# Patient Record
Sex: FEMALE
[De-identification: names, ages, dates, MRNs, and addresses within clinical notes are randomized per-mention and may not be internally consistent; named-entity substitution may affect disease eponyms.]

---

## 2017-11-12 ENCOUNTER — HOSPITAL (OUTPATIENT)
Dept: OTHER | Age: 57
End: 2017-11-12
Attending: EMERGENCY MEDICINE

## 2017-11-12 LAB
ANALYZER ANC (IANC): ABNORMAL
ANION GAP SERPL CALC-SCNC: 12 MMOL/L (ref 10–20)
APTT PPP: 27 SECONDS (ref 22–30)
APTT PPP: NORMAL S
BASOPHILS # BLD: 0.1 THOUSAND/MCL (ref 0–0.3)
BASOPHILS NFR BLD: 1 %
BUN SERPL-MCNC: 12 MG/DL (ref 6–20)
BUN/CREAT SERPL: 16 (ref 7–25)
CALCIUM SERPL-MCNC: 9.4 MG/DL (ref 8.4–10.2)
CHLORIDE: 107 MMOL/L (ref 98–107)
CO2 SERPL-SCNC: 24 MMOL/L (ref 21–32)
CREAT SERPL-MCNC: 0.75 MG/DL (ref 0.51–0.95)
DIFFERENTIAL METHOD BLD: ABNORMAL
EOSINOPHIL # BLD: 0.3 THOUSAND/MCL (ref 0.1–0.5)
EOSINOPHIL NFR BLD: 3 %
ERYTHROCYTE [DISTWIDTH] IN BLOOD: 15.1 % (ref 11–15)
GLUCOSE SERPL-MCNC: 136 MG/DL (ref 65–99)
HEMATOCRIT: 48.5 % (ref 36–46.5)
HGB BLD-MCNC: 17.3 GM/DL (ref 12–15.5)
INR PPP: 1.1
LYMPHOCYTES # BLD: 2.4 THOUSAND/MCL (ref 1–4)
LYMPHOCYTES NFR BLD: 22 %
MCH RBC QN AUTO: 35.2 PG (ref 26–34)
MCHC RBC AUTO-ENTMCNC: 35.7 GM/DL (ref 32–36.5)
MCV RBC AUTO: 98.6 FL (ref 78–100)
MONOCYTES # BLD: 0.9 THOUSAND/MCL (ref 0.3–0.9)
MONOCYTES NFR BLD: 9 %
NEUTROPHILS # BLD: 7.1 THOUSAND/MCL (ref 1.8–7.7)
NEUTROPHILS NFR BLD: 65 %
NEUTS SEG NFR BLD: ABNORMAL %
PERCENT NRBC: 0
PLATELET # BLD: 452 THOUSAND/MCL (ref 140–450)
POTASSIUM SERPL-SCNC: 3.8 MMOL/L (ref 3.4–5.1)
PROTHROMBIN TIME: 11.1 SECONDS (ref 9.7–11.8)
PROTHROMBIN TIME: NORMAL
RBC # BLD: 4.92 MILLION/MCL (ref 4–5.2)
SODIUM SERPL-SCNC: 139 MMOL/L (ref 135–145)
WBC # BLD: 10.8 THOUSAND/MCL (ref 4.2–11)

## 2017-11-20 PROBLEM — I82.411 DVT OF DEEP FEMORAL VEIN, RIGHT (HCC): Status: ACTIVE | Noted: 2017-11-20

## 2018-10-03 PROBLEM — I97.418 ARTERIAL BLEED, INTRAOPERATIVE: Status: ACTIVE | Noted: 2018-10-03

## 2018-10-03 PROBLEM — I25.118 CORONARY ARTERY DISEASE OF NATIVE ARTERY OF NATIVE HEART WITH STABLE ANGINA PECTORIS (HCC): Status: ACTIVE | Noted: 2018-10-03

## 2018-10-03 PROBLEM — IMO0002 INTRA-ABDOMINAL HEMATOMA: Status: ACTIVE | Noted: 2018-10-03

## 2018-10-17 PROBLEM — D75.1 POLYCYTHEMIA: Status: ACTIVE | Noted: 2018-10-17

## 2018-11-01 PROBLEM — F43.11: Status: ACTIVE | Noted: 2018-11-01

## 2019-02-26 PROBLEM — D45 POLYCYTHEMIA VERA (HCC): Status: ACTIVE | Noted: 2019-02-26

## 2019-11-13 PROBLEM — R10.32 LLQ PAIN: Status: ACTIVE | Noted: 2019-11-13

## 2020-06-08 PROBLEM — I21.4 NSTEMI (NON-ST ELEVATED MYOCARDIAL INFARCTION) (HCC): Status: ACTIVE | Noted: 2020-01-13

## 2020-06-08 PROBLEM — D72.829 LEUKOCYTOSIS: Status: ACTIVE | Noted: 2018-09-11

## 2020-06-08 PROBLEM — Z87.891 EX-SMOKER: Status: ACTIVE | Noted: 2018-09-11

## 2020-06-08 PROBLEM — Z98.890 S/P CORONARY ANGIOGRAM: Status: ACTIVE | Noted: 2018-09-11

## 2020-06-08 PROBLEM — Z86.718 HISTORY OF DVT (DEEP VEIN THROMBOSIS): Status: ACTIVE | Noted: 2018-09-11

## 2020-06-08 PROBLEM — D68.32 WARFARIN-INDUCED COAGULOPATHY (HCC): Status: ACTIVE | Noted: 2018-09-11

## 2020-06-08 PROBLEM — T45.515A WARFARIN-INDUCED COAGULOPATHY (HCC): Status: ACTIVE | Noted: 2018-09-11

## 2020-06-08 PROBLEM — E66.9 OBESITY (BMI 35.0-39.9 WITHOUT COMORBIDITY): Status: ACTIVE | Noted: 2020-01-14

## 2020-06-08 PROBLEM — I25.10 ATHEROSCLEROSIS OF CORONARY ARTERY: Status: ACTIVE | Noted: 2018-10-03

## 2020-06-17 PROBLEM — R10.32 LLQ PAIN: Status: RESOLVED | Noted: 2019-11-13 | Resolved: 2020-06-17

## 2020-06-17 PROBLEM — F43.11: Status: RESOLVED | Noted: 2018-11-01 | Resolved: 2020-06-17

## 2022-08-16 PROBLEM — E66.9 OBESITY WITH BODY MASS INDEX 30 OR GREATER: Status: ACTIVE | Noted: 2020-01-14

## 2022-08-16 PROBLEM — D75.1 ERYTHROCYTOSIS: Status: ACTIVE | Noted: 2018-10-17

## 2022-08-16 PROBLEM — R53.1 WEAKNESS: Status: ACTIVE | Noted: 2021-08-19

## 2022-08-16 PROBLEM — I82.419 DEEP VENOUS THROMBOSIS OF PROFUNDA FEMORIS VEIN (HCC): Status: ACTIVE | Noted: 2017-11-20

## 2022-08-16 PROBLEM — Z87.891 FORMER SMOKER: Status: ACTIVE | Noted: 2018-09-11

## 2022-08-16 PROBLEM — I25.10 CORONARY ATHEROSCLEROSIS: Status: ACTIVE | Noted: 2018-10-03

## 2022-08-16 PROBLEM — E78.5 HYPERLIPIDEMIA: Status: ACTIVE | Noted: 2021-12-13

## 2022-08-16 PROBLEM — I21.9 MYOCARDIAL INFARCTION (HCC): Status: ACTIVE | Noted: 2020-01-13

## 2022-08-16 PROBLEM — F32.A DEPRESSION: Status: ACTIVE | Noted: 2021-09-27

## 2022-08-16 PROBLEM — E55.9 VITAMIN D DEFICIENCY: Status: ACTIVE | Noted: 2021-02-17

## 2022-08-16 PROBLEM — R14.0 ABDOMINAL BLOATING: Status: ACTIVE | Noted: 2022-07-13

## 2022-08-16 PROBLEM — R10.12 LEFT UPPER QUADRANT PAIN: Status: ACTIVE | Noted: 2019-11-13

## 2022-08-16 PROBLEM — D64.9 ANEMIA: Status: ACTIVE | Noted: 2021-09-27

## 2022-08-16 PROBLEM — I82.409 DEEP VEIN THROMBOSIS (HCC): Status: ACTIVE | Noted: 2021-12-13

## 2022-08-16 PROBLEM — I49.3 SYMPTOMATIC PVCS: Status: ACTIVE | Noted: 2022-07-13

## 2024-09-19 NOTE — PROGRESS NOTES
NEW PATIENT PROGRESS NOTE  OTOLOGY/OTOLARYNGOLOGY    REF MD:  No referring provider defined for this encounter.     PCP: Frandy Gutierrez MD    CHIEF COMPLAINT:    Chief Complaint   Patient presents with    New Patient    Tinnitus     Patient reports ringing in ear.    Vertigo     Patient is here for Vertigo symptoms, reports  she was hospitalized at [facility]  from 09/13/2024 to 09/15/2024.     HISTORY OF PRESENT ILLNESS: Camilla Beltran is a 64 year old female with a history of MI, DVT, and polycythemia vera, presented to the ER on 9/13/2024 with acute onset of vertigo and dizziness. She states that she suddenly felt dizzy, as if everything was spinning, making it difficult to walk. She managed to sit down and took her blood pressure, which was elevated, prompting her to call 911. She denied any new headache but noticed changes in her vision and worsening tinnitus in her left ear, especially with head movements. Although she could walk back to bed, she was very unsteady. She denied losing consciousness, but reports a sensation of feeling like she was “fighting to not pass out.\" Denies falling, headache, nausea, or vomiting. She was given meclizine, which resolved the vertigo after about an hour. However, she experienced another episode of spinning vertigo later into the night while in the hospital.   The patient reports left-sided tinnitus and previously told that she had hearing loss in the left ear. She experiences nystagmus when scrolling on her phone and endorses optic migraines. Family history includes hearing loss in mother, left-sided hearing loss and tinnitus in brother, and hearing loss and tinnitus in older sister. There is no family history of ear surgery.    PAST MEDICAL HISTORY:    Past Medical History:    Anxiety    Belching    Bloating    Dvt femoral (deep venous thrombosis) (HCC)    DVT of deep femoral vein, right (HCC)    Flatulence/gas pain/belching    Food intolerance    Heart palpitations     Heartburn    Hypothyroidism    Indigestion    Myocardial infarction (HCC)    Night sweats    Sleep disturbance    Stress       PAST SURGICAL HISTORY:    Past Surgical History:   Procedure Laterality Date    Breast surgery      lumpectomy    Cholecystectomy      Knee replacement surgery      Surgery - external      Emergency arterial femoral artery closure.       Current Outpatient Medications on File Prior to Visit   Medication Sig Dispense Refill    SYNTHROID 88 MCG Oral Tab Take 1 tablet (88 mcg total) by mouth daily. 90 tablet 1    Pantoprazole Sodium 40 MG Oral Tab EC Take 1 tablet (40 mg total) by mouth daily. Before meal 90 tablet 1    atorvastatin 40 MG Oral Tab Take 1 tablet (40 mg total) by mouth nightly. 90 tablet 2    Clopidogrel Bisulfate 75 MG Oral Tab Take 1 tablet (75 mg total) by mouth daily. 90 tablet 2    Warfarin Sodium 7.5 MG Oral Tab One tablet nightly or as directed by md 90 tablet 2    Warfarin Sodium 10 MG Oral Tab Take as directed      acetaminophen (TYLENOL EXTRA STRENGTH) 500 MG Oral Tab 500mg 4/day (Patient not taking: Reported on 9/19/2024)      amoxicillin 500 MG Oral Cap take 4 capsule (500MG)  by ORAL route  one hour before procedure (Patient not taking: Reported on 9/19/2024)      HYDROcodone-acetaminophen 5-325 MG Oral Tab TAKE 1 TABLET BY MOUTH EVERY 6 TO 8 HOURS (Patient not taking: Reported on 9/19/2024)      meclizine 25 MG Oral Tab Take 1 tablet (25 mg total) by mouth. (Patient not taking: Reported on 9/19/2024)      ondansetron (ZOFRAN) 4 mg tablet Take 2 tablets (8 mg total) by mouth 2 (two) times daily. (Patient not taking: Reported on 9/19/2024)      ondansetron (ZOFRAN) 4 mg tablet Take 1 tablet (4 mg total) by mouth every 6 (six) hours as needed. (Patient not taking: Reported on 9/19/2024)      nortriptyline 10 MG Oral Cap Take 1 capsule (10 mg total) by mouth nightly. (Patient not taking: Reported on 9/19/2024) 90 capsule 3    ALPRAZolam 0.25 MG Oral Tab Take 1 tablet  (0.25 mg total) by mouth nightly as needed for Anxiety. (Patient not taking: Reported on 9/19/2024) 90 tablet 0    nitroGLYCERIN 0.4 MG Sublingual SL Tab Place 1 tablet (0.4 mg total) under the tongue as needed for Chest pain. (Patient not taking: Reported on 9/19/2024) 30 tablet 0    diazepam 5 MG Oral Tab Take 1 tablet (5 mg total) by mouth nightly as needed for Anxiety. (Patient not taking: Reported on 9/19/2024) 30 tablet 3    Cholecalciferol (VITAMIN D) 2000 units Oral Cap One capsule daily (Patient not taking: Reported on 9/19/2024) 30 capsule 0     No current facility-administered medications on file prior to visit.       Allergies:   Allergies   Allergen Reactions    Celecoxib ANAPHYLAXIS and RASH    Tetracycline NAUSEA AND VOMITING     GI intolerance    Erythromycin UNKNOWN    Other RASH and OTHER (SEE COMMENTS)    Sulfa Antibiotics OTHER (SEE COMMENTS) and RASH       SOCIAL HISTORY:    Social History     Tobacco Use    Smoking status: Former     Current packs/day: 0.50     Average packs/day: 0.5 packs/day for 4.0 years (2.0 ttl pk-yrs)     Types: Cigarettes    Smokeless tobacco: Never   Substance Use Topics    Alcohol use: Yes     Comment: social       FAMILY HISTORY: Family history of hearing loss and tinnitus. Denies known family history of vertigo, or migraine.  Denies known family history of head and neck cancer, thyroid cancer, bleeding disorders.     REVIEW OF SYSTEMS:   Positives are in bold  Neuro: Headache/ocular migraine, facial weakness, facial numbness, neck pain, vertigo, nystagmus   ENT: Hearing change, tinnitus, otorrhea, otalgia, aural fullness, ear pressure, vertigo, imbalance  Sinus pressure, rhinorrhea, congestion, facial pain, jaw pain, dysphagia, odynophagia, sore throat, voice changes, shortness of breath    EXAMINATION:  I washed my hands with an alcohol-based hand gel prior to examination  Constitutional:   --Vitals: Height 5' 10\" (1.778 m), weight 257 lb (116.6 kg), not currently  breastfeeding.  --General: no apparent distress, well-developed, conversant  Psych: affect pleasant and appropriate for age, alert and oriented  Neuro: Facial movement normal bilateral  Eyes: Pupils equal, symmetric and reactive to light.  Extra-ocular muscles intact  Respiratory: No stridor, stertor or increased work of breathing  ENT:  --Ear: The bilateral ears were examined under binocular microscopy  Right ear microscopic exam:  Pinna: Normal, no lesions or masses.  Mastoid: Nontender on palpation.   External auditory canal: Clear, no masses or lesions.  Tympanic membrane: Intact, no lesions, normal landmarks.  Middle ear: Aerated.    Left ear microscopic exam:  Pinna: Normal, no lesions or masses.  Mastoid: Nontender on palpation.   External auditory canal: Clear, no masses or lesions.  Tympanic membrane: Intact, no lesions, normal landmarks.  Middle ear: Aerated.    Latest Audiogram Result (Hz) Exam performed: 9/19/2024 9:45 AM Last edited by Clare Núñez AUD on 9/19/2024 10:02 AM        125 250  1500 2000 3000 4000 6000 8000    Right air:  15 10  5 15 25 25 20 30 45    Left air (masked):  75 70  65 60 70 70 75 80 85    Right mastoid bone:   5  0  20  15      Left mastoid bone (masked):   30  30  35  45         Reliability:  Good    Transducer:  Inserts    Technique:  Conventional Audiometry    Comments:            Latest Speech Audiometry  Last edited by Clare Núñez AUD on 9/19/2024 10:02 AM       Ear Method PTA SAT SRT [facility] Test/list Score (%) Intensity Mask/noise Notes    right recorded   15   10 By Difficulty 100 55      left recorded   60   Half-List 68 80  masked                  Latest Tympanogram Result       Probe Tone (Hz): 226 Exam performed: 9/19/2024 9:45 AM Last edited by Clare Núñez AUD on 9/19/2024 10:02 AM      Left Ear Tympanogram  This tympanogram was drawn by a user, not generated by device data                  Right Ear Left Ear    Tympanogram  type: Type A Type A    Canal volume (mL): 1.8 1.3    Peak pressure (daPa): 1 -1    Peak amplitude (mmho): 1 1    Tympanogram width (daPa):        Comments:                    Latest Audiogram and Tympanogram Result Text  Last edited by Clare Núñez AUD on 9/19/2024 10:07 AM      Addendum      HISTORY:    Camilla Beltran, 64 year old, was seen today for hearing assessment as requested by otolaryngologist Kal Franco MD secondary to complaints of dizziness, left hearing loss and left-sided tinnitus.   See ENT ROS intake form for detailed history.    RESULTS:    Tympanograms were consistent with normal pressure and compliance in each ear.      Pure tone air and bone conduction thresholds were consistent with normal hearing sensitivity through 1500 Hz, sloping to mild to moderate sensorineural hearing loss in the right ear and moderate-severe to severe mixed hearing loss in the left ear.     SRT:  Right: 15 dB HL            Left :  60 dB HL    WRS:  Right: 100% at 55 dB HL             Left: 68% at 80 dB HL    RECOMMENDATIONS:  1.  Follow-up with Kal Franco MD.   2. Audiological monitoring as needed during the course of medical management.           Addended by Clare Núñez AUD on 9/19/2024 10:07 AM                Vestibular examination (9/19/2024): We are doing the exam without infrared frenzel goggles  No neutral gaze nystagmus  Slight right-beating nystagmus after headshake   Hallpike right: negative, Hallpike left: negative      ASSESSMENT/PLAN:  Camilla Beltran is a 64 year old female with     ICD-10-CM   1. Tinnitus of left ear  H93.12   2. Asymmetric SNHL (sensorineural hearing loss)  H90.3   3. Mixed conductive and sensorineural hearing loss of left ear with restricted hearing of right ear  H90.A32   4. Claustrophobia  F40.240   5. Vertigo  R42   6. Vestibular migraine  G43.809   7. Vestibular neuronitis of left ear  H81.22        IMPRESSION:  Vertigo, unspecified   Possible  vestibular neuritis  Possible vestibular migraine  Patient has history of occular migraines  Asymmetric SNHL - possible component of recent left sudden hearing loss but is unclear as we do not have previous audiogram  Left mixed hearing loss with large conductive component   Polycythemia vera on blood thinner w hx of cardiac events  Stroke work up was negative    PLAN:  - Audiogram reviewed with patient,   - Recommend CT Temporal Bones to evaluate left mixed hearing loss  - I recommend asymmetric sensorineural hearing loss and/or unilateral tinnitus be evaluated with an MRI of the brain and IAC's with gadolinium. This test is medically necessary to assess for retrocochlear pathology such as a vestibular schwannoma.    - Patient reports experiencing claustrophobia during MRIs. Recommend a single 2 mg dose of Ativan prior to the MRI.  - Continue Meclizine 25 MG as needed for vertigo  - Recommend VOR vestibular exercises   - Recommend patient to reduce screen time.  - Lifestyle changes including stress reduction, migraine diet (caffeine cessation), sleep hygiene, hydration, regular meals discussed  - Symptom diary  - Patient will send her prior audiogram via [employer] to see if there any component of left SNHL  - Follow up in 4 weeks    Situation reviewed with the patient in detail.    Attention: This note has been scribed by Raiza Worthington under the supervision of Kal Franco MD.     Kal Franco MD  Otology/Otolaryngology  [facility] [place] [id number]  Phone [phone]  Fax [phone]      I have personally performed the services described in this documentation. All medical record entries made by the scribe were at my direction and in my presence. I have reviewed the chart and agree that the medical record reflects my personal performance and is accurate and complete.

## 2024-09-19 NOTE — PATIENT INSTRUCTIONS
EXERCISES FOR VESTIBULAR COMPENSATION (VOR EXERCISES)    The inner ear and eyes work together to help us balance In our environment. When an inner ear becomes weak, it can make you feel off-balance and dizzy because its coordination with our eyes takes time to re-adjust,just as it takes us time to adjust to a new pair of eyeglasses. The brain can be re-trained to accommodate a new weakness in one inner ear if the adjustments are not occurring automatically. These 3 simple exercises can help your brain to adapt more quickly and completely . These exercises should be done while sitting down.    1. Near Distance Exercises   While reading a book, magazine, or even your computer screen, turn your head from side to side while keeping the words in focus.   Do this for 2-3 minutes at a time without stopping.   Repeat the exercise, but ti lt your head up and down instead of side to side.    2. Medium Distance Exercises   While looking at an object at medium distance (a television is a good medium distance target), turn your head side to side while keeping the target in focus.   Do this for 2-3 minutes at a time without stopping (ZipMatchs are a great time to practice this).   Repeat the exercise, but tilt your head up and down instead of side to side.    3. Moving Target   While reading, keep your head still but move the reading material from side to side, keeping the words in focus.   Do this for 2-3 minutes at a time without stopping .   Repeat the exercise, but move the reading material up and down instead of side to side.    These exercises challenge your balance system and will help it to overcome a new inner ear weakness more quickly and completely. Spend 40-60 minutes a day performing these exercises. This does not have to all be done at once, but can be spread out throughout the day. If you are taking vestibular suppressants (such as Meclizine), it will take your brain longer to adapt because the medication will  mask the imbalance your brain is trying to adjust to.

## 2024-10-21 ENCOUNTER — HOSPITAL ENCOUNTER (OUTPATIENT)
Dept: MRI IMAGING | Facility: HOSPITAL | Age: 64
Discharge: HOME | End: 2024-10-21
Attending: OTOLARYNGOLOGY
Payer: MEDICARE

## 2024-10-21 ENCOUNTER — HOSPITAL ENCOUNTER (OUTPATIENT)
Dept: CT IMAGING | Facility: HOSPITAL | Age: 64
Discharge: HOME | End: 2024-10-21
Attending: OTOLARYNGOLOGY
Payer: MEDICARE

## 2024-10-21 DIAGNOSIS — H90.A32 MIXED CONDUCTIVE AND SENSORINEURAL HEARING LOSS OF LEFT EAR WITH RESTRICTED HEARING OF RIGHT EAR: ICD-10-CM

## 2024-10-21 DIAGNOSIS — H90.3 ASYMMETRIC SNHL (SENSORINEURAL HEARING LOSS): ICD-10-CM

## 2024-10-21 PROCEDURE — A9575 INJ GADOTERATE MEGLUMI 0.1ML: HCPCS | Performed by: OTOLARYNGOLOGY

## 2024-10-21 PROCEDURE — 70480 CT ORBIT/EAR/FOSSA W/O DYE: CPT | Performed by: OTOLARYNGOLOGY

## 2024-10-21 PROCEDURE — 70553 MRI BRAIN STEM W/O & W/DYE: CPT | Performed by: OTOLARYNGOLOGY

## 2024-10-21 RX ORDER — DIPHENHYDRAMINE HYDROCHLORIDE 50 MG/ML
20 INJECTION, SOLUTION INTRAMUSCULAR; INTRAVENOUS
Status: COMPLETED | OUTPATIENT
Start: 2024-10-21 | End: 2024-10-21

## 2024-10-21 RX ADMIN — DIPHENHYDRAMINE HYDROCHLORIDE 20 ML: 50 INJECTION, SOLUTION INTRAMUSCULAR; INTRAVENOUS at 19:11:00

## 2024-10-24 ENCOUNTER — OFFICE VISIT (OUTPATIENT)
Facility: LOCATION | Age: 64
End: 2024-10-24
Payer: MEDICARE

## 2024-10-24 VITALS — HEIGHT: 70 IN | WEIGHT: 257 LBS | BODY MASS INDEX: 36.79 KG/M2

## 2024-10-24 DIAGNOSIS — H90.A32 MIXED CONDUCTIVE AND SENSORINEURAL HEARING LOSS OF LEFT EAR WITH RESTRICTED HEARING OF RIGHT EAR: ICD-10-CM

## 2024-10-24 DIAGNOSIS — H80.03: ICD-10-CM

## 2024-10-24 DIAGNOSIS — G43.809 VESTIBULAR MIGRAINE: ICD-10-CM

## 2024-10-24 DIAGNOSIS — H81.22 VESTIBULAR NEURONITIS OF LEFT EAR: Primary | ICD-10-CM

## 2024-10-24 DIAGNOSIS — H90.3 ASYMMETRIC SNHL (SENSORINEURAL HEARING LOSS): ICD-10-CM

## 2024-10-24 PROCEDURE — 99214 OFFICE O/P EST MOD 30 MIN: CPT | Performed by: OTOLARYNGOLOGY

## 2024-10-24 RX ORDER — HYDROXYUREA 500 MG/1
CAPSULE ORAL
COMMUNITY
Start: 2024-09-20

## 2024-10-24 RX ORDER — LORAZEPAM 2 MG/1
TABLET ORAL
COMMUNITY

## 2024-10-24 NOTE — PROGRESS NOTES
PATIENT PROGRESS NOTE  OTOLOGY/OTOLARYNGOLOGY    REF MD:  No referring provider defined for this encounter.     PCP: Frandy Gutierrez MD    CHIEF COMPLAINT:    Chief Complaint   Patient presents with    Follow - Up     Patient here for tinnitus of left ear f/up with MRI and CT Temporal bones results.     LAST VISIT 9/19/2024  IMPRESSION:  Vertigo, unspecified   Possible vestibular neuritis  Possible vestibular migraine  Patient has history of occular migraines  Asymmetric SNHL - possible component of recent left sudden hearing loss but is unclear as we do not have previous audiogram  Left mixed hearing loss with large conductive component   Polycythemia vera on blood thinner w hx of cardiac events  Stroke work up was negative    PLAN:  - Audiogram reviewed with patient,   - Recommend CT Temporal Bones to evaluate left mixed hearing loss  - I recommend asymmetric sensorineural hearing loss and/or unilateral tinnitus be evaluated with an MRI of the brain and IAC's with gadolinium. This test is medically necessary to assess for retrocochlear pathology such as a vestibular schwannoma.    - Patient reports experiencing claustrophobia during MRIs. Recommend a single 2 mg dose of Ativan prior to the MRI.  - Continue Meclizine 25 MG as needed for vertigo  - Recommend VOR vestibular exercises   - Recommend patient to reduce screen time.  - Lifestyle changes including stress reduction, migraine diet (caffeine cessation), sleep hygiene, hydration, regular meals discussed  - Symptom diary  - Patient will send her prior audiogram via [facility] to see if there any component of left SNHL  - Follow up in 4 weeks  _________________________________________________________________________________  Interval history: Patient reports starting exercises at home and feels less off-balance. She occasionally experiences mild \"swimmy-headed\" sensations, but they are not as severe. She has also reduced her screen time. Also here to review MRI  IACs and CT Temporal bones results. Previous audiogram now available for comparison to most recent one. Reports that she is on blood thinners and experiences epistaxis.     HISTORY OF PRESENT ILLNESS: Camilla Hines is a 64 year old female with a history of MI, DVT, and polycythemia vera, presented to the ER on 9/13/2024 with acute onset of vertigo and dizziness. She states that she suddenly felt dizzy, as if everything was spinning, making it difficult to walk. She managed to sit down and took her blood pressure, which was elevated, prompting her to call 911. She denied any new headache but noticed changes in her vision and worsening tinnitus in her left ear, especially with head movements. Although she could walk back to bed, she was very unsteady. She denied losing consciousness, but reports a sensation of feeling like she was “fighting to not pass out.\" Denies falling, headache, nausea, or vomiting. She was given meclizine, which resolved the vertigo after about an hour. However, she experienced another episode of spinning vertigo later into the night while in the hospital.   The patient reports left-sided tinnitus and previously told that she had hearing loss in the left ear. She experiences nystagmus when scrolling on her phone and endorses optic migraines. Family history includes hearing loss in mother, left-sided hearing loss and tinnitus in brother, and hearing loss and tinnitus in older sister. There is no family history of ear surgery.    PAST MEDICAL HISTORY:    Past Medical History:    Anxiety    Belching    Bloating    Dvt femoral (deep venous thrombosis) (HCC)    DVT of deep femoral vein, right (HCC)    Flatulence/gas pain/belching    Food intolerance    Heart palpitations    Heartburn    Hypothyroidism    Indigestion    Myocardial infarction (HCC)    Night sweats    Sleep disturbance    Stress       PAST SURGICAL HISTORY:    Past Surgical History:   Procedure Laterality Date    Breast surgery       lumpectomy    Cholecystectomy      Knee replacement surgery      Surgery - external      Emergency arterial femoral artery closure.       Current Outpatient Medications on File Prior to Visit   Medication Sig Dispense Refill    hydroxyurea 500 MG Oral Cap       LORazepam 2 MG Oral Tab       SYNTHROID 88 MCG Oral Tab Take 1 tablet (88 mcg total) by mouth daily. 90 tablet 1    Pantoprazole Sodium 40 MG Oral Tab EC Take 1 tablet (40 mg total) by mouth daily. Before meal 90 tablet 1    atorvastatin 40 MG Oral Tab Take 1 tablet (40 mg total) by mouth nightly. 90 tablet 2    Clopidogrel Bisulfate 75 MG Oral Tab Take 1 tablet (75 mg total) by mouth daily. 90 tablet 2    Warfarin Sodium 7.5 MG Oral Tab One tablet nightly or as directed by md 90 tablet 2    Warfarin Sodium 10 MG Oral Tab Take as directed      acetaminophen (TYLENOL EXTRA STRENGTH) 500 MG Oral Tab 500mg 4/day (Patient not taking: Reported on 10/24/2024)      amoxicillin 500 MG Oral Cap take 4 capsule (500MG)  by ORAL route  one hour before procedure (Patient not taking: Reported on 10/24/2024)      HYDROcodone-acetaminophen 5-325 MG Oral Tab TAKE 1 TABLET BY MOUTH EVERY 6 TO 8 HOURS (Patient not taking: Reported on 10/24/2024)      meclizine 25 MG Oral Tab Take 1 tablet (25 mg total) by mouth. (Patient not taking: Reported on 10/24/2024)      ondansetron (ZOFRAN) 4 mg tablet Take 2 tablets (8 mg total) by mouth 2 (two) times daily. (Patient not taking: Reported on 10/24/2024)      ondansetron (ZOFRAN) 4 mg tablet Take 1 tablet (4 mg total) by mouth every 6 (six) hours as needed. (Patient not taking: Reported on 10/24/2024)      nortriptyline 10 MG Oral Cap Take 1 capsule (10 mg total) by mouth nightly. (Patient not taking: Reported on 10/24/2024) 90 capsule 3    ALPRAZolam 0.25 MG Oral Tab Take 1 tablet (0.25 mg total) by mouth nightly as needed for Anxiety. (Patient not taking: Reported on 10/24/2024) 90 tablet 0    nitroGLYCERIN 0.4 MG Sublingual SL Tab  Place 1 tablet (0.4 mg total) under the tongue as needed for Chest pain. (Patient not taking: Reported on 10/24/2024) 30 tablet 0    diazepam 5 MG Oral Tab Take 1 tablet (5 mg total) by mouth nightly as needed for Anxiety. (Patient not taking: Reported on 10/24/2024) 30 tablet 3    Cholecalciferol (VITAMIN D) 2000 units Oral Cap One capsule daily (Patient not taking: Reported on 10/24/2024) 30 capsule 0     No current facility-administered medications on file prior to visit.       Allergies:   Allergies   Allergen Reactions    Celecoxib ANAPHYLAXIS and RASH    Tetracycline NAUSEA AND VOMITING     GI intolerance    Erythromycin UNKNOWN    Other RASH and OTHER (SEE COMMENTS)    Sulfa Antibiotics OTHER (SEE COMMENTS) and RASH       SOCIAL HISTORY:    Social History     Tobacco Use    Smoking status: Former     Current packs/day: 0.50     Average packs/day: 0.5 packs/day for 4.0 years (2.0 ttl pk-yrs)     Types: Cigarettes    Smokeless tobacco: Never   Substance Use Topics    Alcohol use: Yes     Comment: social       FAMILY HISTORY: Family history of hearing loss and tinnitus. Denies known family history of vertigo, or migraine.  Denies known family history of head and neck cancer, thyroid cancer, bleeding disorders.     REVIEW OF SYSTEMS:   Positives are in bold  Neuro: Headache/ocular migraine, facial weakness, facial numbness, neck pain, vertigo, nystagmus   ENT: Hearing change, tinnitus, otorrhea, otalgia, aural fullness, ear pressure, vertigo, imbalance  Sinus pressure, rhinorrhea, congestion, facial pain, jaw pain, dysphagia, odynophagia, sore throat, voice changes, shortness of breath    EXAMINATION:  I washed my hands with an alcohol-based hand gel prior to examination  Constitutional:   --Vitals: Height 5' 10\" (1.778 m), weight 257 lb (116.6 kg), not currently breastfeeding.  --General: no apparent distress, well-developed, conversant  Psych: affect pleasant and appropriate for age, alert and oriented  Neuro:  Facial movement normal bilateral  Eyes: Pupils equal, symmetric and reactive to light.  Extra-ocular muscles intact  Respiratory: No stridor, stertor or increased work of breathing  ENT:  --Ear: The bilateral ears were examined under binocular microscopy  Right ear microscopic exam:  Pinna: Normal, no lesions or masses.  Mastoid: Nontender on palpation.   External auditory canal: Clear, no masses or lesions.  Tympanic membrane: Intact, no lesions, normal landmarks.  Middle ear: Aerated.    Left ear microscopic exam:  Pinna: Normal, no lesions or masses.  Mastoid: Nontender on palpation.   External auditory canal: Clear, no masses or lesions.  Tympanic membrane: Intact, no lesions, normal landmarks.  Middle ear: Aerated.    Latest Audiogram Result (Hz) Exam performed: 9/19/2024 9:45 AM Last edited by Clare Núñez AUD on 9/19/2024 10:02 AM        125 250  1500 2000 3000 4000 6000 8000    Right air:  15 10  5 15 25 25 20 30 45    Left air (masked):  75 70  65 60 70 70 75 80 85    Right mastoid bone:   5  0  20  15      Left mastoid bone (masked):   30  30  35  45         Reliability:  Good    Transducer:  Inserts    Technique:  Conventional Audiometry    Comments:            Latest Speech Audiometry  Last edited by Clare Núñez AUD on 9/19/2024 10:02 AM       Ear Method PTA SAT SRT [facility] Test/list Score (%) Intensity Mask/noise Notes    right recorded   15   10 By Difficulty 100 55      left recorded   60   Half-List 68 80  masked                  Latest Tympanogram Result       Probe Tone (Hz): 226 Exam performed: 9/19/2024 9:45 AM Last edited by Clare Núñez AUD on 9/19/2024 10:02 AM      Left Ear Tympanogram  This tympanogram was drawn by a user, not generated by device data                  Right Ear Left Ear    Tympanogram type: Type A Type A    Canal volume (mL): 1.8 1.3    Peak pressure (daPa): 1 -1    Peak amplitude (mmho): 1 1    Tympanogram width (daPa):         Comments:                    Latest Audiogram and Tympanogram Result Text  Last edited by Clare Núñez AUD on 9/19/2024 10:07 AM      Addendum      HISTORY:    Camilla Hines, 64 year old, was seen today for hearing assessment as requested by otolaryngologist Kal Franco MD secondary to complaints of dizziness, left hearing loss and left-sided tinnitus.   See ENT ROS intake form for detailed history.    RESULTS:    Tympanograms were consistent with normal pressure and compliance in each ear.      Pure tone air and bone conduction thresholds were consistent with normal hearing sensitivity through 1500 Hz, sloping to mild to moderate sensorineural hearing loss in the right ear and moderate-severe to severe mixed hearing loss in the left ear.     SRT:  Right: 15 dB HL            Left :  60 dB HL    WRS:  Right: 100% at 55 dB HL             Left: 68% at 80 dB HL    RECOMMENDATIONS:  1.  Follow-up with Kal Franco MD.   2. Audiological monitoring as needed during the course of medical management.           Addended by Clare Núñez AUD on 9/19/2024 10:07 AM                Vestibular examination (9/19/2024): We are doing the exam without infrared frenzel goggles  No neutral gaze nystagmus  Slight right-beating nystagmus after headshake   Hallpike right: negative, Hallpike left: negative      MRI IACS w/wo contrast - 10/21/2024  Impression   CONCLUSION:    1. Unremarkable MR appearance of the internal auditory canals.  2.  No acute infarct, acute intracranial hemorrhage, or hydrocephalus.  3. Mild chronic small vessel ischemic disease.       CT Temporal Bones - 10/21/2024  Personally reviewed: possible otosclerotic focus on left  Impression   CONCLUSION:  Unremarkable temporal bone CT.   ADDENDUM 10/22/2024:   Subtle lucency involving the otic capsule at the stapes footplate on the left on image 63 of series 304 is more likely volume averaging than true otosclerosis given that the  coronal reconstructions the finding is not present.  If hearing progresses, a   follow-up CT scan in 6 months could be performed to evaluate for any change in this apparent lucency.      ASSESSMENT/PLAN:  Camilla Hines is a 64 year old female with     ICD-10-CM   1. Vestibular neuronitis of left ear  H81.22   2. Vestibular migraine  G43.809   3. Asymmetric SNHL (sensorineural hearing loss)  H90.3   4. Mixed conductive and sensorineural hearing loss of left ear with restricted hearing of right ear  H90.A32   5. Non-obliterative otosclerosis, bilateral  H80.03          IMPRESSION:  Vertigo, unspecified   Possible vestibular neuritis  Possible vestibular migraine  Patient has history of occular migraines  Asymmetric SNHL, appears to be longstanding based on comparison to previous audiogram. 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  - CT Temporal Bones reviewed with patient, likely otosclerosis causing left mixed and conductive hearing loss   - MRI IACs reviewed with patient which was normal  - Continue Meclizine 25 MG as needed for vertigo  - Recommend vestibular physical therapy for the treatment of vestibular neuritis    - Continue VOR vestibular exercises   - Recommend patient to continue reducing screen time.  - Lifestyle changes including stress reduction, migraine diet (caffeine cessation), sleep hygiene, hydration, regular meals discussed  - Recommend increasing nasal moisture - humidifier, nasal saline sprays PRN for epistaxis   - Follow-up in 4-5 weeks, will plan to do vestibular exam at that time     Situation reviewed with the patient in detail.    Attention: This note has been scribed by Raiza Worthington under  the supervision of Kal Franco MD.     Kal Franco MD  Otology/Otolaryngology  [facility]   [address]  [place] [address]  Phone [phone]  Fax [phone]      I have personally performed the services described in this documentation. All medical record entries made by the scribe were at my direction and in my presence. I have reviewed the chart and agree that the medical record reflects my personal performance and is accurate and complete.

## 2024-11-21 ENCOUNTER — OFFICE VISIT (OUTPATIENT)
Facility: LOCATION | Age: 64
End: 2024-11-21
Payer: MEDICARE

## 2024-11-21 VITALS — BODY MASS INDEX: 36.79 KG/M2 | WEIGHT: 257 LBS | HEIGHT: 70 IN

## 2024-11-21 DIAGNOSIS — H90.3 ASYMMETRIC SNHL (SENSORINEURAL HEARING LOSS): Primary | ICD-10-CM

## 2024-11-21 DIAGNOSIS — H90.72 MIXED CONDUCTIVE AND SENSORINEURAL HEARING LOSS OF LEFT EAR WITH UNRESTRICTED HEARING OF RIGHT EAR: ICD-10-CM

## 2024-11-21 DIAGNOSIS — H93.A2 PULSATILE TINNITUS OF LEFT EAR: ICD-10-CM

## 2024-11-21 DIAGNOSIS — H81.20 VESTIBULAR NEURONITIS, UNSPECIFIED LATERALITY: ICD-10-CM

## 2024-11-21 DIAGNOSIS — G43.809 VESTIBULAR MIGRAINE: ICD-10-CM

## 2024-11-21 PROCEDURE — 99214 OFFICE O/P EST MOD 30 MIN: CPT | Performed by: OTOLARYNGOLOGY

## 2024-11-21 NOTE — PROGRESS NOTES
PATIENT PROGRESS NOTE  OTOLOGY/OTOLARYNGOLOGY    REF MD:  No referring provider defined for this encounter.     PCP: Frandy Gutierrez MD    CHIEF COMPLAINT:    No chief complaint on file.    LAST VISIT 10/24/2024  IMPRESSION:  Vertigo, unspecified   Possible vestibular neuritis  Possible vestibular migraine  Patient has history of occular migraines  Asymmetric SNHL, appears to be longstanding based on comparison to previous audiogram. 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  - CT Temporal Bones reviewed with patient, likely otosclerosis causing left mixed and conductive hearing loss   - MRI IACs reviewed with patient which was normal  - Continue Meclizine 25 MG as needed for vertigo  - Recommend vestibular physical therapy for the treatment of vestibular neuritis    - Continue VOR vestibular exercises   - Recommend patient to continue reducing screen time.  - Lifestyle changes including stress reduction, migraine diet (caffeine cessation), sleep hygiene, hydration, regular meals discussed  - Recommend increasing nasal moisture - humidifier, nasal saline sprays PRN for epistaxis   - Follow-up in 4-5 weeks, will plan to do vestibular exam at that time   _________________________________________________________________________________  Interval history: Patient underwent physical therapy at [facility] and has noted significant improvement in symptoms, feeling largely back to normal. However, they experience a very small amount of intermittent, swimming-type imbalance. Patient is interested in discussing their left pulsatile tinnitus and hearing loss further.    HISTORY OF PRESENT ILLNESS: Camilla Hines is a 64 year old female  with a history of MI, DVT, and polycythemia vera, presented to the ER on 9/13/2024 with acute onset of vertigo and dizziness. She states that she suddenly felt dizzy, as if everything was spinning, making it difficult to walk. She managed to sit down and took her blood pressure, which was elevated, prompting her to call 911. She denied any new headache but noticed changes in her vision and worsening tinnitus in her left ear, especially with head movements. Although she could walk back to bed, she was very unsteady. She denied losing consciousness, but reports a sensation of feeling like she was “fighting to not pass out.\" Denies falling, headache, nausea, or vomiting. She was given meclizine, which resolved the vertigo after about an hour. However, she experienced another episode of spinning vertigo later into the night while in the hospital.   The patient reports left-sided tinnitus and previously told that she had hearing loss in the left ear. She experiences nystagmus when scrolling on her phone and endorses optic migraines. Family history includes hearing loss in mother, left-sided hearing loss and tinnitus in brother, and hearing loss and tinnitus in older sister. There is no family history of ear surgery.    PAST MEDICAL HISTORY:    Past Medical History:    Anxiety    Belching    Bloating    Dvt femoral (deep venous thrombosis) (HCC)    DVT of deep femoral vein, right (HCC)    Flatulence/gas pain/belching    Food intolerance    Heart palpitations    Heartburn    Hypothyroidism    Indigestion    Myocardial infarction (HCC)    Night sweats    Sleep disturbance    Stress       PAST SURGICAL HISTORY:    Past Surgical History:   Procedure Laterality Date    Breast surgery      lumpectomy    Cholecystectomy      Knee replacement surgery      Surgery - external      Emergency arterial femoral artery closure.       Current Outpatient Medications on File Prior to Visit   Medication Sig Dispense Refill    hydroxyurea  500 MG Oral Cap       LORazepam 2 MG Oral Tab       acetaminophen (TYLENOL EXTRA STRENGTH) 500 MG Oral Tab 500mg 4/day (Patient not taking: Reported on 10/24/2024)      amoxicillin 500 MG Oral Cap take 4 capsule (500MG)  by ORAL route  one hour before procedure (Patient not taking: Reported on 10/24/2024)      HYDROcodone-acetaminophen 5-325 MG Oral Tab TAKE 1 TABLET BY MOUTH EVERY 6 TO 8 HOURS (Patient not taking: Reported on 10/24/2024)      meclizine 25 MG Oral Tab Take 1 tablet (25 mg total) by mouth. (Patient not taking: Reported on 10/24/2024)      ondansetron (ZOFRAN) 4 mg tablet Take 2 tablets (8 mg total) by mouth 2 (two) times daily. (Patient not taking: Reported on 10/24/2024)      ondansetron (ZOFRAN) 4 mg tablet Take 1 tablet (4 mg total) by mouth every 6 (six) hours as needed. (Patient not taking: Reported on 10/24/2024)      nortriptyline 10 MG Oral Cap Take 1 capsule (10 mg total) by mouth nightly. (Patient not taking: Reported on 10/24/2024) 90 capsule 3    ALPRAZolam 0.25 MG Oral Tab Take 1 tablet (0.25 mg total) by mouth nightly as needed for Anxiety. (Patient not taking: Reported on 10/24/2024) 90 tablet 0    SYNTHROID 88 MCG Oral Tab Take 1 tablet (88 mcg total) by mouth daily. 90 tablet 1    Pantoprazole Sodium 40 MG Oral Tab EC Take 1 tablet (40 mg total) by mouth daily. Before meal 90 tablet 1    atorvastatin 40 MG Oral Tab Take 1 tablet (40 mg total) by mouth nightly. 90 tablet 2    Clopidogrel Bisulfate 75 MG Oral Tab Take 1 tablet (75 mg total) by mouth daily. 90 tablet 2    Warfarin Sodium 7.5 MG Oral Tab One tablet nightly or as directed by md 90 tablet 2    Warfarin Sodium 10 MG Oral Tab Take as directed      nitroGLYCERIN 0.4 MG Sublingual SL Tab Place 1 tablet (0.4 mg total) under the tongue as needed for Chest pain. (Patient not taking: Reported on 10/24/2024) 30 tablet 0    diazepam 5 MG Oral Tab Take 1 tablet (5 mg total) by mouth nightly as needed for Anxiety. (Patient not taking:  Reported on 10/24/2024) 30 tablet 3    Cholecalciferol (VITAMIN D) 2000 units Oral Cap One capsule daily (Patient not taking: Reported on 10/24/2024) 30 capsule 0     No current facility-administered medications on file prior to visit.       Allergies:   Allergies   Allergen Reactions    Celecoxib ANAPHYLAXIS and RASH    Tetracycline NAUSEA AND VOMITING     GI intolerance    Erythromycin UNKNOWN    Other RASH and OTHER (SEE COMMENTS)    Sulfa Antibiotics OTHER (SEE COMMENTS) and RASH       SOCIAL HISTORY:    Social History     Tobacco Use    Smoking status: Former     Current packs/day: 0.50     Average packs/day: 0.5 packs/day for 4.0 years (2.0 ttl pk-yrs)     Types: Cigarettes    Smokeless tobacco: Never   Substance Use Topics    Alcohol use: Yes     Comment: social       FAMILY HISTORY: Family history of hearing loss and tinnitus. Denies known family history of vertigo, or migraine.  Denies known family history of head and neck cancer, thyroid cancer, bleeding disorders.     REVIEW OF SYSTEMS:   Positives are in bold  Neuro: Headache/ocular migraine, facial weakness, facial numbness, neck pain, vertigo  ENT: Hearing change, tinnitus, otorrhea, otalgia, aural fullness, ear pressure, vertigo, imbalance  Sinus pressure, rhinorrhea, congestion, facial pain, jaw pain, dysphagia, odynophagia, sore throat, voice changes, shortness of breath    EXAMINATION:  I washed my hands with an alcohol-based hand gel prior to examination  Constitutional:   --Vitals: not currently breastfeeding.  --General: no apparent distress, well-developed, conversant  Psych: affect pleasant and appropriate for age, alert and oriented  Neuro: Facial movement normal bilateral  Eyes: Pupils equal, symmetric and reactive to light.  Extra-ocular muscles intact  Respiratory: No stridor, stertor or increased work of breathing  ENT:  --Ear: The bilateral ears were examined under binocular microscopy  Right ear microscopic exam:  Pinna: Normal, no  lesions or masses.  Mastoid: Nontender on palpation.   External auditory canal: Clear, no masses or lesions.  Tympanic membrane: Intact, no lesions, normal landmarks.  Middle ear: Aerated.    Left ear microscopic exam:  Pinna: Normal, no lesions or masses.  Mastoid: Nontender on palpation.   External auditory canal: Clear, no masses or lesions.  Tympanic membrane: Intact, no lesions, normal landmarks.  Middle ear: Aerated.    Latest Audiogram Result (Hz) Exam performed: 9/19/2024 9:45 AM Last edited by Clare Núñez AUD on 9/19/2024 10:02 AM        125 250  1500 2000 3000 4000 6000 8000    Right air:  15 10  5 15 25 25 20 30 45    Left air (masked):  75 70  65 60 70 70 75 80 85    Right mastoid bone:   5  0  20  15      Left mastoid bone (masked):   30  30  35  45         Reliability:  Good    Transducer:  Inserts    Technique:  Conventional Audiometry    Comments:            Latest Speech Audiometry  Last edited by Clare Núñez AUD on 9/19/2024 10:02 AM       Ear Method PTA SAT SRT [facility] Test/list Score (%) Intensity Mask/noise Notes    right recorded   15   10 By Difficulty 100 55      left recorded   60   Half-List 68 80  masked                  Latest Tympanogram Result       Probe Tone (Hz): 226 Exam performed: 9/19/2024 9:45 AM Last edited by Clare Núñez AUD on 9/19/2024 10:02 AM      Left Ear Tympanogram  This tympanogram was drawn by a user, not generated by device data                  Right Ear Left Ear    Tympanogram type: Type A Type A    Canal volume (mL): 1.8 1.3    Peak pressure (daPa): 1 -1    Peak amplitude (mmho): 1 1    Tympanogram width (daPa):        Comments:                    Latest Audiogram and Tympanogram Result Text  Last edited by Clare Núñez AUD on 9/19/2024 10:07 AM      Addendum      HISTORY:    Camilla Hines, 64 year old, was seen today for hearing assessment as requested by otolaryngologist Kal Franco MD secondary to  complaints of dizziness, left hearing loss and left-sided tinnitus.   See ENT ROS intake form for detailed history.    RESULTS:    Tympanograms were consistent with normal pressure and compliance in each ear.      Pure tone air and bone conduction thresholds were consistent with normal hearing sensitivity through 1500 Hz, sloping to mild to moderate sensorineural hearing loss in the right ear and moderate-severe to severe mixed hearing loss in the left ear.     SRT:  Right: 15 dB HL            Left :  60 dB HL    WRS:  Right: 100% at 55 dB HL             Left: 68% at 80 dB HL    RECOMMENDATIONS:  1.  Follow-up with Kal Franco MD.   2. Audiological monitoring as needed during the course of medical management.           Addended by Clare Núñez, MARLIN on 9/19/2024 10:07 AM                Vestibular examination (9/19/2024): We are doing the exam without infrared frenzel goggles  No neutral gaze nystagmus  Slight right-beating nystagmus after headshake   Hallpike right: negative, Hallpike left: negative      MRI IACS w/wo contrast - 10/21/2024  Impression   CONCLUSION:    1. Unremarkable MR appearance of the internal auditory canals.  2.  No acute infarct, acute intracranial hemorrhage, or hydrocephalus.  3. Mild chronic small vessel ischemic disease.       CT Temporal Bones - 10/21/2024  Personally reviewed: possible otosclerotic focus on left  Impression   CONCLUSION:  Unremarkable temporal bone CT.   ADDENDUM 10/22/2024:   Subtle lucency involving the otic capsule at the stapes footplate on the left on image 63 of series 304 is more likely volume averaging than true otosclerosis given that the coronal reconstructions the finding is not present.  If hearing progresses, a   follow-up CT scan in 6 months could be performed to evaluate for any change in this apparent lucency.      ASSESSMENT/PLAN:  Camilla Hines is a 64 year old female with   No diagnosis found.         IMPRESSION:  Vestibular  neuritis with minor component of vestibular migraine - largely resolved.   Asymmetric SNHL, appears to be longstanding based on comparison to previous audiogram. Normal MRI IACs   Left mixed hearing loss with large conductive component, likely otosclerosis. Left pulsatile tinnitus   Polycythemia vera on blood thinner with history of cardiac events  Stroke work up was negative  Normal MRI IACs    PLAN:  - Discussed with patient that their pulsatile tinnitus is likely related to the conductive component of mixed hearing loss, which is associated with otosclerosis.  - Patient is medically cleared for hearing aid on the left  - Continue vestibular physical therapy exercises at home for the treatment of vestibular neuritis    - Discussed with the patient that a stapedotomy is not recommended at this time in light of recent vestibular neuritis and vestibular migraine issues. If the patient wishes to proceed with this procedure in the future, it should be undertaken with caution given their history of vertigo.  - Lifestyle changes including stress reduction, migraine diet (caffeine cessation), sleep hygiene, hydration, regular meals discussed  - Recommend serial audiograms and follow up as needed     Situation reviewed with the patient in detail.    Attention: This note has been scribed by Raiza Worthington under the supervision of Kal Franco MD.     Kal Franco MD  Otology/Otolaryngology  [address] [place] [address]  Phone [phone]  Fax [phone]      I have personally performed the services described in this documentation. All medical record entries made by the scribe were at my direction and in my presence. I have reviewed the chart and agree that the medical record reflects my personal performance and is accurate and complete.